# Patient Record
Sex: MALE
[De-identification: names, ages, dates, MRNs, and addresses within clinical notes are randomized per-mention and may not be internally consistent; named-entity substitution may affect disease eponyms.]

---

## 2020-01-27 ENCOUNTER — HOSPITAL ENCOUNTER (OUTPATIENT)
Dept: HOSPITAL 5 - PF | Age: 60
Discharge: HOME | End: 2020-01-27
Attending: SURGERY
Payer: MEDICARE

## 2020-01-27 DIAGNOSIS — E66.01: Primary | ICD-10-CM

## 2020-01-27 DIAGNOSIS — E66.2: ICD-10-CM

## 2020-01-27 PROCEDURE — 94010 BREATHING CAPACITY TEST: CPT

## 2020-01-28 ENCOUNTER — HOSPITAL ENCOUNTER (OUTPATIENT)
Dept: HOSPITAL 5 - GIO | Age: 60
Discharge: HOME | End: 2020-01-28
Attending: SURGERY
Payer: MEDICARE

## 2020-01-28 VITALS — DIASTOLIC BLOOD PRESSURE: 86 MMHG | SYSTOLIC BLOOD PRESSURE: 137 MMHG

## 2020-01-28 DIAGNOSIS — K21.9: Primary | ICD-10-CM

## 2020-01-28 DIAGNOSIS — Z98.890: ICD-10-CM

## 2020-01-28 DIAGNOSIS — G47.30: ICD-10-CM

## 2020-01-28 DIAGNOSIS — K31.89: ICD-10-CM

## 2020-01-28 DIAGNOSIS — Z95.1: ICD-10-CM

## 2020-01-28 DIAGNOSIS — I10: ICD-10-CM

## 2020-01-28 DIAGNOSIS — I25.10: ICD-10-CM

## 2020-01-28 DIAGNOSIS — K44.9: ICD-10-CM

## 2020-01-28 DIAGNOSIS — Z88.8: ICD-10-CM

## 2020-01-28 DIAGNOSIS — E66.01: ICD-10-CM

## 2020-01-28 DIAGNOSIS — M06.9: ICD-10-CM

## 2020-01-28 PROCEDURE — 43239 EGD BIOPSY SINGLE/MULTIPLE: CPT

## 2020-01-28 PROCEDURE — 88342 IMHCHEM/IMCYTCHM 1ST ANTB: CPT

## 2020-01-28 PROCEDURE — 88305 TISSUE EXAM BY PATHOLOGIST: CPT

## 2020-01-28 NOTE — OPERATIVE REPORT
Operative Report


Operative Report: 





DATE 1/28/20





SURGERY:  Upper endoscopy with antral biopsy





SURGEON:  Shahana Sherwood M.D.





ASSISTANT: n/a





PRE OP DX: morbid obesity, GERD





POST OP DX: same as pre-op





TYPE OF ANESTHESIA:  MAC.





ESTIMATED BLOOD LOSS:  None.





COMPLICATIONS:  None.





SPECIMENS REMOVED:  antral mucosal biopsy





FINDINGS:


1.  Small hiatal hernia.


2.  Otherwise, normal esophagus, stomach and first portion of duodenum.





INDICATIONS:INDICATION FOR PROCEDURE:  Patient is a 59-year-old male with a long

history of morbid obesity.  He is planned to have a weight loss procedure and is

here for preoperative planning EGD.





PROCEDURE DETAILS: After consent was reviewed, patient was taken back to


the operating room where patient was placed in the left lateral decubitus


position and a bite block was placed in the mouth.  After a time-out was


called, MAC anesthesia was initiated.  I then passed the endoscope into his


oropharynx, into his esophagus, visualized the entire esophagus, which was all


within normal limits.  The Z-line was noted to be about 38cm from the incisors. 

I then visualized the stomach and the first portion of


the duodenum and there were no abnormalities I could clearly visualize.  I


then retroflexed the scope in the stomach and visualized the hiatus and I


could see a small hiatal hernia. A cold biopsy was taken of the antral mucosa to

evaluate for h.pylori for comprehensive pre-op for bariatric surgery.  I then 

desufflated the stomach and


removed the endoscope.  Patient tolerated procedure well and was transferred


to recovery room in good and stable condition.

## 2020-01-28 NOTE — ANESTHESIA CONSULTATION
Anesthesia Consult and Med Hx


Date of service: 01/28/20





- Airway


Anesthetic Teeth Evaluation: Poor (multiple chipped, broken off teeth, mostly 

upper front)


ROM Head & Neck: Adequate


Mental/Hyoid Distance: Adequate


Mallampati Class: Class III


Intubation Access Assessment: Possibly Difficult





- Pre-Operative Health Status


ASA Pre-Surgery Classification: ASA3


Proposed Anesthetic Plan: MAC





- Pulmonary


Hx Sleep Apnea: Yes





- Cardiovascular System


Hx Hypertension: Yes


Hx Coronary Artery Disease: Yes (CABG (2003) x 2 bypasses)


Hx Heart Attack/AMI: Yes (x3?)


Hx Percutaneous Transluminal Coronary Angioplasty (PTCA): Yes (2003, 2007,2015)





- Central Nervous System


Hx Back Pain: Yes (rheumatoid arthritis)





- Other Systems


Hx Obesity: Yes (Morbid obesity BMI 43.1)

## 2020-01-28 NOTE — DISCHARGE SUMMARY
Providers





- Providers


Date of Admission: 


1/28/20


Date of discharge: 01/28/20


Attending physician: 


MARISA HERNANDEZ MD





Primary care physician: 


FIORELLA WERNER








Hospitalization


Reason for admission: egd


Condition: Good


Procedures: 





egd


Hospital course: 





pt had an uneventful egd as part of work up prior to bariatric surgery


Disposition: DC-01 TO HOME OR SELFCARE





Core Measure Documentation





- Palliative Care


Palliative Care/ Comfort Measures: Not Applicable





- Core Measures


Any of the following diagnoses?: none





Exam





- Physical Exam


Narrative exam: 


unchanged from pre-op





- Constitutional


Vitals: 


                                        











Temp Pulse Resp BP Pulse Ox


 


 98.6 F   67   14   150/85   95 


 


 01/28/20 07:29  01/28/20 07:29  01/28/20 07:29  01/28/20 07:29  01/28/20 07:29














Plan


Activity: no restrictions


Weight Bearing Status: Full Weight Bearing


Diet: low carbohydrate


Follow up with: 


FIORELLA WERNER MD [Primary Care Provider] - 7 Days

## 2020-01-28 NOTE — PULMONARY FUNCTION TEST
SPIROMETRY:  FVC is 2.98 liters, which is 70% of the predicted.  FEV1 is 2.36,

which is 73% of the predicted.  FEV1/FVC ratio is 79 and FEF 25-75% is 2.34

liters per second, which is 84% of the predicted.



IMPRESSION:  Based on this spirometry, this is a normal spirometry, however,

based on this spirometry, you cannot rule out restrictive ventilatory

impairment.





DD: 01/27/2020 16:57

DT: 01/28/2020 01:42

JOB# [id number]  [id number]

RODERICK/NTS

## 2024-08-13 ENCOUNTER — P2P PATIENT RECORD (OUTPATIENT)
Age: 64
End: 2024-08-13

## 2024-10-14 ENCOUNTER — TELEPHONE ENCOUNTER (OUTPATIENT)
Dept: URBAN - METROPOLITAN AREA CLINIC 109 | Facility: CLINIC | Age: 64
End: 2024-10-14

## 2025-02-06 ENCOUNTER — P2P PATIENT RECORD (OUTPATIENT)
Age: 65
End: 2025-02-06

## 2025-03-21 ENCOUNTER — OFFICE VISIT (OUTPATIENT)
Dept: URBAN - METROPOLITAN AREA CLINIC 118 | Facility: CLINIC | Age: 65
End: 2025-03-21